# Patient Record
Sex: FEMALE | Race: WHITE | NOT HISPANIC OR LATINO | Employment: OTHER | URBAN - METROPOLITAN AREA
[De-identification: names, ages, dates, MRNs, and addresses within clinical notes are randomized per-mention and may not be internally consistent; named-entity substitution may affect disease eponyms.]

---

## 2022-05-10 ENCOUNTER — NURSE NAVIGATOR (OUTPATIENT)
Dept: ONCOLOGY | Facility: CLINIC | Age: 72
End: 2022-05-10

## 2022-05-10 ENCOUNTER — OFFICE VISIT (OUTPATIENT)
Dept: PULMONOLOGY | Facility: CLINIC | Age: 72
End: 2022-05-10

## 2022-05-10 VITALS
WEIGHT: 95 LBS | DIASTOLIC BLOOD PRESSURE: 82 MMHG | HEART RATE: 59 BPM | SYSTOLIC BLOOD PRESSURE: 132 MMHG | HEIGHT: 62 IN | OXYGEN SATURATION: 96 % | BODY MASS INDEX: 17.48 KG/M2

## 2022-05-10 DIAGNOSIS — J43.2 CENTRILOBULAR EMPHYSEMA: ICD-10-CM

## 2022-05-10 DIAGNOSIS — R91.1 LUNG NODULE: Primary | ICD-10-CM

## 2022-05-10 DIAGNOSIS — Z00.6 EXAMINATION FOR NORMAL COMPARISON OR CONTROL IN CLINICAL RESEARCH: Primary | ICD-10-CM

## 2022-05-10 DIAGNOSIS — J40 BRONCHITIS: ICD-10-CM

## 2022-05-10 PROCEDURE — 99205 OFFICE O/P NEW HI 60 MIN: CPT | Performed by: INTERNAL MEDICINE

## 2022-05-10 RX ORDER — MELATONIN
1000 DAILY
COMMUNITY

## 2022-05-10 RX ORDER — IBUPROFEN 800 MG/1
800 TABLET ORAL 2 TIMES DAILY PRN
COMMUNITY
Start: 2022-03-17

## 2022-05-10 RX ORDER — BACLOFEN 10 MG/1
10 TABLET ORAL
COMMUNITY
Start: 2022-02-15

## 2022-05-10 RX ORDER — LANOLIN ALCOHOL/MO/W.PET/CERES
1000 CREAM (GRAM) TOPICAL DAILY
COMMUNITY

## 2022-05-10 RX ORDER — GABAPENTIN 300 MG/1
300 CAPSULE ORAL
COMMUNITY
Start: 2022-04-18

## 2022-05-10 RX ORDER — AMOXICILLIN AND CLAVULANATE POTASSIUM 500; 125 MG/1; MG/1
1 TABLET, FILM COATED ORAL 2 TIMES DAILY
Qty: 24 TABLET | Refills: 0 | Status: SHIPPED | OUTPATIENT
Start: 2022-05-10 | End: 2022-05-22

## 2022-05-10 RX ORDER — GABAPENTIN 300 MG/1
CAPSULE ORAL
COMMUNITY
End: 2022-06-08 | Stop reason: SDUPTHER

## 2022-05-10 RX ORDER — OMEGA-3 FATTY ACIDS/FISH OIL 300-1000MG
CAPSULE ORAL
COMMUNITY
End: 2022-05-10 | Stop reason: SDUPTHER

## 2022-05-10 NOTE — PROGRESS NOTES
Met patient and  in lung nodule clinic with Dr. Sauer. She has smoking history but quit 2008. She was being followed for lung abnormality by PCP. She had recent CT leading to PET evaluation revealing 1.3cm KERRIE nodule with SUV of 4.6. She has had clinical signs of infection including productive cough, pleuritic chest pain and weight loss. She has been on antibiotic therapies intermittently. Most recently mid/late April x7days of levofloxacin. She denies hemoptysis. Scans reviewed. Per Dr. Sauer x2 wk course of ABX with [facility] CT and OV same day early June. She v/u and agreeable to plan. Introduced lung navigator role and provided contact information. She knows to call with questions or concerns.

## 2022-05-10 NOTE — PROGRESS NOTES
Pulmonary Medicine Evaluation    Chief Complaint     PET avid pulmonary nodule    History of Present Illness/History Review     Is Amanda is a very nice 72-year-old former smoker ex of 2008 with a past medical history significant for centrilobular emphysema as well as a history of multiple pulmonary nodules that have been under surveillance.  Unfortunately I do not have much information regarding previous CT scans however she presents today for follow-up of a pet scan which was performed on 3/30/2022.  She states that she has frequent exacerbations of her respiratory status including multiple episodes of bronchitis per year.  She also states that she has been having some pleuritic chest pain in both the right as well as left side intermittently.  She has not had any fevers or chills.  She does note that she has lost approximately 20 pounds of weight in the past year or so and states that her appetite is fairly poor.  For the most part she does not feel short of breath even with activity.  She does bring up mucus from time to time and it is currently yellow.  She states that in April she had had an increased cough as well as some left chest soreness with expectoration of yellow and green sputum.  There was no hemoptysis.  She states that she also had been somewhat sick through March.  She did complete a 7-day course of Levaquin at the end of April and states that while she got a little bit better she did not feel that had cleared everything up for her.    The PET scan was performed on 3/30/2022 and was ordered for further evaluation of some pulmonary nodularity.  The report compares this to an old CT of 10/18/2021 though I have not been able to review this film or the report myself.  The PET scan which I have been able to review does show an approximately 1.3 cm irregular nodule in the posterior left upper lobe near the fissure.  This is PET avid with an SUV of 4.6.  There is also some questionable minimal PET  avidity to the station 4R however this is not enlarged.  The SUV is at physiologic level of 1.7.  The report notes that the nodule is relatively unchanged compared to the CT scan in 2021.  In addition, there are some scattered areas of apical scarring as well as some infiltrates versus scar in both the lingula as well as the right middle lobe.        Review of Systems  A full review of systems has been completed and it is negative except as mentioned expressly in the HPI and the scanned intake sheet.    Allergies   Allergen Reactions   • Codeine Anaphylaxis       Current Outpatient Medications:   •  baclofen (LIORESAL) 10 MG tablet, Take 10 mg by mouth every night at bedtime., Disp: , Rfl:   •  cholecalciferol (VITAMIN D3) 25 MCG (1000 UT) tablet, Take 1,000 Units by mouth Daily., Disp: , Rfl:   •  gabapentin (NEURONTIN) 300 MG capsule, Take 300 mg by mouth every night at bedtime., Disp: , Rfl:   •  ibuprofen (ADVIL,MOTRIN) 800 MG tablet, Take 800 mg by mouth 2 (Two) Times a Day As Needed., Disp: , Rfl:   •  vitamin B-12 (CYANOCOBALAMIN) 1000 MCG tablet, Take 1,000 mcg by mouth Daily., Disp: , Rfl:   •  amoxicillin-clavulanate (Augmentin) 500-125 MG per tablet, Take 1 tablet by mouth 2 (Two) Times a Day for 12 days., Disp: 24 tablet, Rfl: 0  •  gabapentin (NEURONTIN) 300 MG capsule, gabapentin, Disp: , Rfl:     Past Medical History:   Diagnosis Date   • Allergic rhinitis    • Chronic bronchitis (HCC)    • Pneumonia      Family History   Problem Relation Age of Onset   • Cancer Mother    • Hypotension Mother    • Thyroid cancer Mother    • Cancer Father    • Cancer Paternal Aunt    • Cancer Paternal Uncle    • Cancer Child      Social History     Tobacco Use   • Smoking status: Former Smoker     Packs/day: 1.00     Years: 20.00     Pack years: 20.00     Quit date: 2010     Years since quittin.0   • Smokeless tobacco: Never Used   Vaping Use   • Vaping Use: Never used   Substance Use Topics   • Drug  "use: Defer       /82   Pulse 59   Ht 157.5 cm (62\")   Wt 43.1 kg (95 lb)   SpO2 96% Comment: resting room air  BMI 17.38 kg/m²   Physical Exam  Constitutional:       Comments: Thin well-developed woman in no acute distress   HENT:      Head: Normocephalic and atraumatic.      Nose: Nose normal. No congestion.      Mouth/Throat:      Mouth: Mucous membranes are moist.      Pharynx: No oropharyngeal exudate.   Eyes:      General: No scleral icterus.     Extraocular Movements: Extraocular movements intact.      Conjunctiva/sclera: Conjunctivae normal.      Pupils: Pupils are equal, round, and reactive to light.   Neck:      Vascular: No carotid bruit.   Cardiovascular:      Rate and Rhythm: Regular rhythm. Bradycardia present.      Pulses: Normal pulses.      Heart sounds: Normal heart sounds. No murmur heard.  Pulmonary:      Effort: Pulmonary effort is normal. No respiratory distress.      Breath sounds: Normal breath sounds. No stridor. No wheezing, rhonchi or rales.   Chest:      Chest wall: No tenderness.   Abdominal:      General: Abdomen is flat. Bowel sounds are normal. There is no distension.      Palpations: Abdomen is soft.   Musculoskeletal:         General: No swelling or tenderness. Normal range of motion.      Cervical back: Normal range of motion and neck supple. No rigidity or tenderness.   Lymphadenopathy:      Cervical: No cervical adenopathy.   Skin:     General: Skin is warm.      Capillary Refill: Capillary refill takes less than 2 seconds.   Neurological:      General: No focal deficit present.      Mental Status: She is alert.   Psychiatric:         Mood and Affect: Mood normal.         Results     Pet imaging reviewed as per HPI.  Problem List       ICD-10-CM ICD-9-CM   1. Lung nodule  R91.1 793.11   2. Centrilobular emphysema (HCC)  J43.2 492.8   3. Bronchitis  J40 490       Impression and Plan     Mrs. Patel presents with a PET avid nodule in the left upper lobe which at least " reportedly was present on an old CT scan of 10/18/2021 which I have not been able to review.  She does currently have at least acute bronchitis if not pneumonia.  She is at very high risk of this being malignant however there is a possibility this could represent focal infection as well.  At this point, I feel that it would be reasonable to go ahead and treat her for a longer course with antibiotics and then after this has been completed to go ahead and perform a noncontrasted CT scan of the chest.  This will allow us to look for resolution however more importantly if it does not resolve it will give us a better roadmap for possible navigational bronchoscopy which I feel would be the best way to sample this area if necessary.  At this time I would not recommend any bronchodilator therapy as she does not have much in the way of symptoms from shortness of breath though she does have underlying COPD with emphysematous changes.  I am going to go ahead and give her a 12-day course of Augmentin.  We will plan to repeat the CT scan of the chest without contrast at the beginning of June and then have her follow-up later in that day with me.    Overall I do feel that we will likely need to proceed with biopsy at some point unless this nodule worsening to resolve or decrease in size.    I have encouraged her to try to increase her caloric intake and she is going to try some fruit flavored Ensure as she has somewhat of an intolerance to milk-based or milk like drinks.    Thank you very much for asking me to help in the care of this very nice patient.    I spent a total of 60 minutes with Mrs. Patel and her  with greater than 70% that time spent consultation regarding her diagnoses, review the data, and formulation of the plan moving forward.    Sancho Sauer MD, [facility]    CC: Gómez Bang MD

## 2022-05-19 ENCOUNTER — PATIENT ROUNDING (BHMG ONLY) (OUTPATIENT)
Dept: PULMONOLOGY | Facility: CLINIC | Age: 72
End: 2022-05-19

## 2022-05-19 NOTE — PROGRESS NOTES
"May 19, 2022    Hello, may I speak with Malissa Patel?    My name is SMILEY      I am  with [facility]  [address] [place] [id number] [phone].    Before we get started may I verify your date of birth? 1950    I am calling to officially welcome you to our practice and ask about your recent visit. Is this a good time to talk? YES    Tell me about your visit with us. What things went well? \" Everything went real well,  I thought Dr Sauer was great. Just waiting to hear when my next appointment is\"        We're always looking for ways to make our patients' experiences even better. Do you have recommendations on ways we may improve?  NO    Overall were you satisfied with your first visit to our practice? YES       I appreciate you taking the time to speak with me today. Is there anything else I can do for you? NO      Thank you, and have a great day.      "

## 2022-06-08 ENCOUNTER — HOSPITAL ENCOUNTER (OUTPATIENT)
Dept: CT IMAGING | Facility: HOSPITAL | Age: 72
Discharge: HOME | End: 2022-06-08
Admitting: NURSE PRACTITIONER

## 2022-06-08 ENCOUNTER — OFFICE VISIT (OUTPATIENT)
Dept: PULMONOLOGY | Facility: CLINIC | Age: 72
End: 2022-06-08

## 2022-06-08 VITALS
BODY MASS INDEX: 17.3 KG/M2 | OXYGEN SATURATION: 97 % | WEIGHT: 94 LBS | SYSTOLIC BLOOD PRESSURE: 124 MMHG | HEIGHT: 62 IN | TEMPERATURE: 97.5 F | DIASTOLIC BLOOD PRESSURE: 80 MMHG | HEART RATE: 84 BPM

## 2022-06-08 DIAGNOSIS — E44.0 MODERATE MALNUTRITION: ICD-10-CM

## 2022-06-08 DIAGNOSIS — R91.1 LUNG NODULE: ICD-10-CM

## 2022-06-08 DIAGNOSIS — R91.1 LUNG NODULE: Primary | ICD-10-CM

## 2022-06-08 DIAGNOSIS — J40 BRONCHITIS: ICD-10-CM

## 2022-06-08 DIAGNOSIS — J43.2 CENTRILOBULAR EMPHYSEMA: ICD-10-CM

## 2022-06-08 PROCEDURE — 71250 CT THORAX DX C-: CPT

## 2022-06-08 PROCEDURE — 99213 OFFICE O/P EST LOW 20 MIN: CPT | Performed by: INTERNAL MEDICINE

## 2022-06-08 RX ORDER — AMOXICILLIN AND CLAVULANATE POTASSIUM 500; 125 MG/1; MG/1
1 TABLET, FILM COATED ORAL 2 TIMES DAILY
Qty: 28 TABLET | Refills: 0 | Status: SHIPPED | OUTPATIENT
Start: 2022-06-08 | End: 2022-06-22

## 2022-06-08 RX ORDER — MEGESTROL ACETATE 20 MG/1
20 TABLET ORAL 2 TIMES DAILY
Qty: 60 TABLET | Refills: 2 | Status: SHIPPED | OUTPATIENT
Start: 2022-06-08

## 2022-06-10 DIAGNOSIS — R91.1 LUNG NODULE: Primary | ICD-10-CM

## 2022-06-16 NOTE — PROGRESS NOTES
Pulmonary Medicine Follow-up    Chief Complaint     Follow-up of left upper lobe spiculated nodule    History of Present Illness/History Review     Mrs. Patel returns to the clinic today in the company of her family.  She states that she has actually been doing fairly well since I last saw her in May.  She states that the Augmentin helped her quite a bit although she states that she continues to have a bit more congestion and has been coughing up green and yellow secretions.  She has not had any problems with fevers or chills.  She states that she would like to have further antibiotics as this did help her quite a bit.  Her appetite remains poor although she has been trying to force food down as able.  She has denied any hemoptysis.  She remains short of breath with activity.    CT scan of the chest performed without contrast today shows almost complete resolution of the previously seen left upper lobe/fissural spiculated nodule.  The remainder the lungs appear stable with chronic changes seen.  No new nodularity is noted.      Review of Systems  A full review of systems has been completed and it is negative except as mentioned expressly in HPI.    Allergies   Allergen Reactions   • Codeine Anaphylaxis       Current Outpatient Medications:   •  baclofen (LIORESAL) 10 MG tablet, Take 10 mg by mouth every night at bedtime., Disp: , Rfl:   •  cholecalciferol (VITAMIN D3) 25 MCG (1000 UT) tablet, Take 1,000 Units by mouth Daily., Disp: , Rfl:   •  gabapentin (NEURONTIN) 300 MG capsule, Take 300 mg by mouth every night at bedtime., Disp: , Rfl:   •  ibuprofen (ADVIL,MOTRIN) 800 MG tablet, Take 800 mg by mouth 2 (Two) Times a Day As Needed., Disp: , Rfl:   •  vitamin B-12 (CYANOCOBALAMIN) 1000 MCG tablet, Take 1,000 mcg by mouth Daily., Disp: , Rfl:   •  amoxicillin-clavulanate (Augmentin) 500-125 MG per tablet, Take 1 tablet by mouth 2 (Two) Times a Day for 14 days., Disp: 28 tablet, Rfl: 0  •  megestrol (MEGACE) 20 MG  "tablet, Take 1 tablet by mouth 2 (Two) Times a Day., Disp: 60 tablet, Rfl: 2    Past Medical History:   Diagnosis Date   • Allergic rhinitis    • Chronic bronchitis (HCC)    • Pneumonia      Family History   Problem Relation Age of Onset   • Cancer Mother    • Hypotension Mother    • Thyroid cancer Mother    • Cancer Father    • Cancer Paternal Aunt    • Cancer Paternal Uncle    • Cancer Child      Social History     Tobacco Use   • Smoking status: Former Smoker     Packs/day: 1.00     Years: 20.00     Pack years: 20.00     Quit date: 2010     Years since quittin.1   • Smokeless tobacco: Never Used   Vaping Use   • Vaping Use: Never used   Substance Use Topics   • Drug use: Defer       /80   Pulse 84   Temp 97.5 °F (36.4 °C)   Ht 157.5 cm (62\")   Wt 42.6 kg (94 lb)   LMP  (LMP Unknown)   SpO2 97% Comment: resting, room air  Breastfeeding No   BMI 17.19 kg/m²   Physical Exam  Constitutional:       Comments: Cachectic appearing.  No acute distress.   HENT:      Head: Normocephalic and atraumatic.      Nose: Nose normal. No congestion.      Mouth/Throat:      Mouth: Mucous membranes are moist.      Pharynx: No oropharyngeal exudate.   Eyes:      General: No scleral icterus.     Extraocular Movements: Extraocular movements intact.      Conjunctiva/sclera: Conjunctivae normal.      Pupils: Pupils are equal, round, and reactive to light.   Neck:      Vascular: No carotid bruit.   Cardiovascular:      Rate and Rhythm: Regular rhythm. Bradycardia present.      Pulses: Normal pulses.      Heart sounds: Normal heart sounds. No murmur heard.  Pulmonary:      Effort: Pulmonary effort is normal. No respiratory distress.      Breath sounds: No wheezing or rales.      Comments: She does have some mildly coarse breath sounds in the bilateral bases.  Otherwise clear.  Mildly coarse upper airway sounds with no stridor.  Chest:      Chest wall: No tenderness.   Abdominal:      General: Abdomen is flat. Bowel " sounds are normal. There is no distension.      Palpations: Abdomen is soft.   Musculoskeletal:         General: No swelling or tenderness. Normal range of motion.      Cervical back: Normal range of motion and neck supple. No rigidity or tenderness.   Lymphadenopathy:      Cervical: No cervical adenopathy.   Skin:     General: Skin is warm.      Capillary Refill: Capillary refill takes less than 2 seconds.   Neurological:      General: No focal deficit present.      Mental Status: She is alert.   Psychiatric:         Mood and Affect: Mood normal.         Results     CT scan as per the HPI.    Problem List       ICD-10-CM ICD-9-CM   1. Lung nodule  R91.1 793.11   2. Centrilobular emphysema (HCC)  J43.2 492.8   3. Bronchitis, acute  J40 490   4. Moderate malnutrition (HCC)  E44.0 263.0       Impression and Plan     I am very happy to see that the left upper lobe nodule has almost completely resolved and based upon the appearance is most likely secondary to resolving inflammation or prior infection.  She is having some issues with bronchitis and as she previously did very well with Augmentin I would like to go ahead and give her another course of this for 14 days see if we can help clear up her breathing a bit better.    I continue to be concerned about her poor appetite and weight loss and I feel that she would benefit from an appetite stimulant.  To that end, I am going to prescribe Megace 20 mg by mouth twice daily.  I have also encouraged her to supplement her diet with nutrition shakes.    I still believe it is important for us to follow this CAT scan to make sure that we have full resolution given her risk factors.  I would like to see her back in the next 3 to 4 months and at that time we will perform a noncontrasted CT scan.  I did explain that we would spread out our surveillance depending upon the results of that test.    I have spent a total of 20 minutes with Mrs. Alvares and her family with greater than 70%  that time spent consultation regarding her diagnoses, review the data, and formulation of the plan moving forward.    Sancho Sauer MD, [facility]    CC: Gómez Bang MD

## 2022-09-08 ENCOUNTER — HOSPITAL ENCOUNTER (OUTPATIENT)
Dept: CT IMAGING | Facility: HOSPITAL | Age: 72
Discharge: HOME | End: 2022-09-08
Admitting: NURSE PRACTITIONER

## 2022-09-08 DIAGNOSIS — R91.1 LUNG NODULE: ICD-10-CM

## 2022-09-08 PROCEDURE — 71250 CT THORAX DX C-: CPT

## 2022-09-09 ENCOUNTER — OFFICE VISIT (OUTPATIENT)
Dept: PULMONOLOGY | Facility: CLINIC | Age: 72
End: 2022-09-09

## 2022-09-09 VITALS
BODY MASS INDEX: 16.63 KG/M2 | TEMPERATURE: 97.5 F | HEART RATE: 71 BPM | OXYGEN SATURATION: 97 % | DIASTOLIC BLOOD PRESSURE: 80 MMHG | SYSTOLIC BLOOD PRESSURE: 140 MMHG | HEIGHT: 62 IN | WEIGHT: 90.4 LBS

## 2022-09-09 DIAGNOSIS — J42 CHRONIC BRONCHITIS, UNSPECIFIED CHRONIC BRONCHITIS TYPE: ICD-10-CM

## 2022-09-09 DIAGNOSIS — R91.1 LUNG NODULE: ICD-10-CM

## 2022-09-09 DIAGNOSIS — J40 BRONCHITIS: Primary | ICD-10-CM

## 2022-09-09 DIAGNOSIS — R91.1 PULMONARY NODULE: ICD-10-CM

## 2022-09-09 PROCEDURE — 99214 OFFICE O/P EST MOD 30 MIN: CPT | Performed by: NURSE PRACTITIONER

## 2022-09-09 RX ORDER — AMOXICILLIN AND CLAVULANATE POTASSIUM 875; 125 MG/1; MG/1
1 TABLET, FILM COATED ORAL 2 TIMES DAILY
Qty: 20 TABLET | Refills: 0 | Status: SHIPPED | OUTPATIENT
Start: 2022-09-09 | End: 2023-03-06

## 2022-09-09 RX ORDER — BUSPIRONE HYDROCHLORIDE 10 MG/1
10 TABLET ORAL 3 TIMES DAILY
COMMUNITY
Start: 2022-06-21

## 2022-09-09 NOTE — PROGRESS NOTES
[facility] Pulmonary Follow up    CHIEF COMPLAINT    Dyspnea with exertion    HISTORY OF PRESENT ILLNESS    Malissa Patel is a 72 y.o.female here today for follow-up.  She was last seen in the office by Dr. Duque in June.  She was referred to our office for a pulmonary nodule originally in May.  We have been continuing to follow closely.  She had a repeat CT scan yesterday and is here today to discuss the results.    At her last appointment Dr. Duque started her on Megace.  She states that she is eating better but has not been able to gain weight.      She did contact the COVID-19 virus a month ago and has had difficulty with her taste and smell and appetite.    She continues to have shortness of breath with exertion.  She does recover fairly quickly at rest.  She is on no inhaler therapy at this time.    She quit smoking in 2010 and has a 20-pack-year history.    She denies chest pain or palpitations.  She denies any lower extremity edema or calf tenderness.  She denies reflux symptoms.    She states that she feels congested and has been coughing up some thick yellow sputum.  She requesting the antibiotics that were called in at her last appointment.    She is up-to-date on her current vaccinations.  Patient Active Problem List   Diagnosis   • Pulmonary nodule   • Chronic bronchitis (HCC)       Allergies   Allergen Reactions   • Codeine Anaphylaxis       Current Outpatient Medications:   •  baclofen (LIORESAL) 10 MG tablet, Take 10 mg by mouth every night at bedtime., Disp: , Rfl:   •  gabapentin (NEURONTIN) 300 MG capsule, Take 300 mg by mouth every night at bedtime., Disp: , Rfl:   •  ibuprofen (ADVIL,MOTRIN) 800 MG tablet, Take 800 mg by mouth 2 (Two) Times a Day As Needed., Disp: , Rfl:   •  Phenylephrine-APAP-guaiFENesin (TYLENOL SINUS SEVERE PO), Take  by mouth., Disp: , Rfl:   •  amoxicillin-clavulanate (Augmentin) 875-125 MG per tablet, Take 1 tablet by mouth 2 (Two) Times a Day., Disp: 20 tablet,  Rfl: 0  •  busPIRone (BUSPAR) 10 MG tablet, Take 10 mg by mouth 3 (Three) Times a Day., Disp: , Rfl:   •  cholecalciferol (VITAMIN D3) 25 MCG (1000 UT) tablet, Take 1,000 Units by mouth Daily., Disp: , Rfl:   •  megestrol (MEGACE) 20 MG tablet, Take 1 tablet by mouth 2 (Two) Times a Day., Disp: 60 tablet, Rfl: 2  •  vitamin B-12 (CYANOCOBALAMIN) 1000 MCG tablet, Take 1,000 mcg by mouth Daily., Disp: , Rfl:   MEDICATION LIST AND ALLERGIES REVIEWED.    Social History     Tobacco Use   • Smoking status: Former Smoker     Packs/day: 1.00     Years: 20.00     Pack years: 20.00     Quit date: 2010     Years since quittin.3   • Smokeless tobacco: Never Used   Vaping Use   • Vaping Use: Never used   Substance Use Topics   • Drug use: Defer       FAMILY AND SOCIAL HISTORY REVIEWED.    Review of Systems   Constitutional: Positive for fatigue. Negative for activity change, appetite change, fever and unexpected weight change.   HENT: Negative for congestion, postnasal drip, rhinorrhea, sinus pressure, sore throat and voice change.    Eyes: Negative for visual disturbance.   Respiratory: Positive for shortness of breath. Negative for cough, chest tightness and wheezing.    Cardiovascular: Negative for chest pain, palpitations and leg swelling.   Gastrointestinal: Negative for abdominal distention, abdominal pain, nausea and vomiting.   Endocrine: Negative for cold intolerance and heat intolerance.   Genitourinary: Negative for difficulty urinating and urgency.   Musculoskeletal: Negative for arthralgias, back pain and neck pain.   Skin: Negative for color change and pallor.   Allergic/Immunologic: Negative for environmental allergies and food allergies.   Neurological: Negative for dizziness, syncope, weakness and light-headedness.   Hematological: Negative for adenopathy. Does not bruise/bleed easily.   Psychiatric/Behavioral: Negative for agitation and behavioral problems.   .    /80 (BP Location: Right arm,  "Patient Position: Sitting, Cuff Size: Adult)   Pulse 71   Temp 97.5 °F (36.4 °C) (Infrared)   Ht 157.5 cm (62\")   Wt 41 kg (90 lb 6.4 oz)   LMP  (LMP Unknown)   SpO2 97% Comment: resting, room air  Breastfeeding No   BMI 16.53 kg/m²     Immunization History   Administered Date(s) Administered   • COVID-19 (MODERNA) 1st, 2nd, 3rd Dose Only 03/06/2021, 04/07/2021   • FLUAD TRI 65YR+ 10/29/2017   • Pneumococcal Conjugate 13-Valent (PCV13) 10/29/2017       Physical Exam  Vitals and nursing note reviewed.   Constitutional:       Appearance: She is well-developed. She is not diaphoretic.   HENT:      Head: Normocephalic and atraumatic.   Eyes:      Pupils: Pupils are equal, round, and reactive to light.   Neck:      Thyroid: No thyromegaly.   Cardiovascular:      Rate and Rhythm: Normal rate and regular rhythm.      Heart sounds: Normal heart sounds. No murmur heard.    No friction rub. No gallop.   Pulmonary:      Effort: Pulmonary effort is normal. No respiratory distress.      Breath sounds: Normal breath sounds. No wheezing or rales.   Chest:      Chest wall: No tenderness.   Abdominal:      General: Bowel sounds are normal.      Palpations: Abdomen is soft.      Tenderness: There is no abdominal tenderness.   Musculoskeletal:         General: Normal range of motion.      Cervical back: Normal range of motion and neck supple.   Lymphadenopathy:      Cervical: No cervical adenopathy.   Skin:     General: Skin is warm and dry.      Capillary Refill: Capillary refill takes less than 2 seconds.   Neurological:      Mental Status: She is alert and oriented to person, place, and time.   Psychiatric:         Behavior: Behavior normal.           RESULTS    CT Chest Without Contrast Diagnostic    Result Date: 9/8/2022  1. No acute intrathoracic process. 2. No new or enlarging pulmonary nodule. 3. Chronic scarring at the apices appears stable. 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 4. Additional chronic findings above.  This report was finalized on 9/8/2022 12:24 PM by Morgan Snell MD.      PROBLEM LIST    Problem List Items Addressed This Visit        Pulmonary and Pneumonias    Pulmonary nodule    Chronic bronchitis (HCC)    Relevant Medications    Phenylephrine-APAP-guaiFENesin (TYLENOL SINUS SEVERE PO)      Other Visit Diagnoses     Bronchitis    -  Primary    Relevant Medications    Phenylephrine-APAP-guaiFENesin (TYLENOL SINUS SEVERE PO)    amoxicillin-clavulanate (Augmentin) 875-125 MG per tablet    Lung nodule        Relevant Orders    CT Chest Without Contrast            DISCUSSION  Ms. Patel was here for follow-up.  We did review her CT scan that was performed yesterday and shows stable findings.  We will continue to do a follow-up CT scan in 6 months.  This will be due in March.    I will call in an antibiotic for her bronchitis.  I did advise her to keep this on hand in case she were to get sick in the near future.    She will follow-up in 6 months or sooner if her symptoms worsen.  I did advise her that we would perform PFTs at her next appointment.    Level of service justified based on 31 minutes spent in patient care on this date of service including, but not limited to: preparing to see the patient, obtaining and/or reviewing history, performing medically appropriate examination, ordering tests/medicine/procedures, independently interpreting results, documenting clinical information in EHR, and counseling/education of patient/family/caregiver. (Level 4 30-39 minutes; Level 5 40-54 minutes)      ANASTASIIA Sheikh  09/09/202215:29 EDT  Electronically signed     Please note that portions of this note were completed with a voice recognition program.        CC: Gómez Bang MD

## 2023-03-01 ENCOUNTER — APPOINTMENT (OUTPATIENT)
Dept: CT IMAGING | Facility: HOSPITAL | Age: 73
End: 2023-03-01

## 2023-03-06 ENCOUNTER — HOSPITAL ENCOUNTER (OUTPATIENT)
Dept: CT IMAGING | Facility: HOSPITAL | Age: 73
Discharge: HOME | End: 2023-03-06
Admitting: NURSE PRACTITIONER
Payer: MEDICARE

## 2023-03-06 ENCOUNTER — OFFICE VISIT (OUTPATIENT)
Dept: PULMONOLOGY | Facility: CLINIC | Age: 73
End: 2023-03-06
Payer: MEDICARE

## 2023-03-06 VITALS
SYSTOLIC BLOOD PRESSURE: 136 MMHG | WEIGHT: 95.25 LBS | OXYGEN SATURATION: 97 % | DIASTOLIC BLOOD PRESSURE: 70 MMHG | HEART RATE: 74 BPM | TEMPERATURE: 97.7 F | HEIGHT: 62 IN | RESPIRATION RATE: 18 BRPM | BODY MASS INDEX: 17.53 KG/M2

## 2023-03-06 DIAGNOSIS — R91.1 PULMONARY NODULE: ICD-10-CM

## 2023-03-06 DIAGNOSIS — J42 CHRONIC BRONCHITIS, UNSPECIFIED CHRONIC BRONCHITIS TYPE: Primary | ICD-10-CM

## 2023-03-06 DIAGNOSIS — R91.1 LUNG NODULE: ICD-10-CM

## 2023-03-06 DIAGNOSIS — Z87.891 PERSONAL HISTORY OF SMOKING: ICD-10-CM

## 2023-03-06 PROCEDURE — 99214 OFFICE O/P EST MOD 30 MIN: CPT | Performed by: NURSE PRACTITIONER

## 2023-03-06 PROCEDURE — 71250 CT THORAX DX C-: CPT

## 2023-03-06 RX ORDER — AMOXICILLIN AND CLAVULANATE POTASSIUM 875; 125 MG/1; MG/1
1 TABLET, FILM COATED ORAL 2 TIMES DAILY
Qty: 20 TABLET | Refills: 0 | Status: SHIPPED | OUTPATIENT
Start: 2023-03-06

## 2023-03-06 NOTE — PROGRESS NOTES
[facility] up    CHIEF COMPLAINT    Shortness of breath    HISTORY OF PRESENT ILLNESS    Malissa Patel is a 73 y.o.female here today for follow-up.  She was last seen in the office by me in September.  She states that she did use the antibiotic that I sent her to have on hand in case she were to get sick.  She used it a couple months ago.  She states that it did clear up her infection quickly.  She denies any respiratory illnesses since this time.    She is currently not on any inhaler therapy.  She does have some shortness of breath with exertion but does recover very quickly at rest.  She has not noticed a significant worsening shortness of breath since her last appointment.    She denies sputum production or hemoptysis.  She denies any fever, chills or night sweats.  She denies any chest pain or palpitations.  She denies any lower extremity edema or calf tenderness.    She quit smoking in 2010 and has a 20-pack-year history.    She is accompanied by her  today.  She had a repeat CT scan performed today and is here today to discuss the results.    Patient Active Problem List   Diagnosis   • Pulmonary nodule   • Chronic bronchitis (HCC)       Allergies   Allergen Reactions   • Codeine Anaphylaxis       Current Outpatient Medications:   •  baclofen (LIORESAL) 10 MG tablet, Take 1 tablet by mouth every night at bedtime., Disp: , Rfl:   •  busPIRone (BUSPAR) 10 MG tablet, Take 1 tablet by mouth 3 (Three) Times a Day., Disp: , Rfl:   •  cholecalciferol (VITAMIN D3) 25 MCG (1000 UT) tablet, Take 1 tablet by mouth Daily., Disp: , Rfl:   •  gabapentin (NEURONTIN) 300 MG capsule, Take 1 capsule by mouth every night at bedtime., Disp: , Rfl:   •  ibuprofen (ADVIL,MOTRIN) 800 MG tablet, Take 1 tablet by mouth 2 (Two) Times a Day As Needed., Disp: , Rfl:   •  megestrol (MEGACE) 20 MG tablet, Take 1 tablet by mouth 2 (Two) Times a Day., Disp: 60 tablet, Rfl: 2  •  Phenylephrine-APAP-guaiFENesin  "(TYLENOL SINUS SEVERE PO), Take  by mouth., Disp: , Rfl:   •  vitamin B-12 (CYANOCOBALAMIN) 1000 MCG tablet, Take 1 tablet by mouth Daily., Disp: , Rfl:   •  amoxicillin-clavulanate (Augmentin) 875-125 MG per tablet, Take 1 tablet by mouth 2 (Two) Times a Day., Disp: 20 tablet, Rfl: 0  MEDICATION LIST AND ALLERGIES REVIEWED.    Social History     Tobacco Use   • Smoking status: Former     Packs/day: 1.00     Years: 20.00     Pack years: 20.00     Types: Cigarettes     Quit date: 2010     Years since quittin.8   • Smokeless tobacco: Never   Vaping Use   • Vaping Use: Never used   Substance Use Topics   • Drug use: Defer       FAMILY AND SOCIAL HISTORY REVIEWED.    Review of Systems   Constitutional: Positive for fatigue. Negative for activity change, appetite change, fever and unexpected weight change.   HENT: Negative for congestion, postnasal drip, rhinorrhea, sinus pressure, sore throat and voice change.    Eyes: Negative for visual disturbance.   Respiratory: Positive for shortness of breath. Negative for cough, chest tightness and wheezing.    Cardiovascular: Negative for chest pain, palpitations and leg swelling.   Gastrointestinal: Negative for abdominal distention, abdominal pain, nausea and vomiting.   Endocrine: Negative for cold intolerance and heat intolerance.   Genitourinary: Negative for difficulty urinating and urgency.   Musculoskeletal: Negative for arthralgias, back pain and neck pain.   Skin: Negative for color change and pallor.   Allergic/Immunologic: Negative for environmental allergies and food allergies.   Neurological: Negative for dizziness, syncope, weakness and light-headedness.   Hematological: Negative for adenopathy. Does not bruise/bleed easily.   Psychiatric/Behavioral: Negative for agitation and behavioral problems.   .    /70 (BP Location: Left arm, Patient Position: Sitting, Cuff Size: Adult)   Pulse 74   Temp 97.7 °F (36.5 °C)   Resp 18   Ht 157.5 cm (62.01\")  "  Wt 43.2 kg (95 lb 4 oz)   LMP  (LMP Unknown)   SpO2 97% Comment: room air at rest  BMI 17.42 kg/m²     Immunization History   Administered Date(s) Administered   • COVID-19 (MODERNA) 1st, 2nd, 3rd Dose Only 03/06/2021, 04/07/2021   • FLUAD TRI 65YR+ 10/29/2017   • Pneumococcal Conjugate 13-Valent (PCV13) 10/29/2017       Physical Exam  Vitals and nursing note reviewed.   Constitutional:       Appearance: She is well-developed. She is not diaphoretic.   HENT:      Head: Normocephalic and atraumatic.   Eyes:      Pupils: Pupils are equal, round, and reactive to light.   Neck:      Thyroid: No thyromegaly.   Cardiovascular:      Rate and Rhythm: Normal rate and regular rhythm.      Heart sounds: Normal heart sounds. No murmur heard.    No friction rub. No gallop.   Pulmonary:      Effort: Pulmonary effort is normal. No respiratory distress.      Breath sounds: Normal breath sounds. No wheezing or rales.   Chest:      Chest wall: No tenderness.   Abdominal:      General: Bowel sounds are normal.      Palpations: Abdomen is soft.      Tenderness: There is no abdominal tenderness.   Musculoskeletal:         General: No swelling. Normal range of motion.      Cervical back: Normal range of motion and neck supple.   Lymphadenopathy:      Cervical: No cervical adenopathy.   Skin:     General: Skin is warm and dry.      Capillary Refill: Capillary refill takes less than 2 seconds.   Neurological:      Mental Status: She is alert and oriented to person, place, and time.   Psychiatric:         Mood and Affect: Mood normal.         Behavior: Behavior normal.           RESULTS    CT Chest Without Contrast Diagnostic    Result Date: 3/6/2023  Impression: 1.No new or enlarging pulmonary nodule. 2.Stable appearance of chronic bronchiectasis, scarring, tree-in-bud nodularity in the right middle lobe and lingula which may be due to sequela of chronic atypical mycobacterial infection. Electronically Signed: Alla Chu  3/6/2023  11:06 AM EST  Workstation ID: [id number]    PROBLEM LIST    Problem List Items Addressed This Visit        Pulmonary and Pneumonias    Pulmonary nodule    Chronic bronchitis (HCC) - Primary    Relevant Medications    amoxicillin-clavulanate (Augmentin) 875-125 MG per tablet   Other Visit Diagnoses     Personal history of smoking        Relevant Orders    CT chest low dose wo            DISCUSSION    Ms. Patel was here for follow-up after she had a CT scan performed today.  We did review the CT scan that showed no new or enlarging pulmonary nodules.  He also shows chronic stable bronchiectasis in the right middle lobe and lingula.  She is currently asymptomatic.  She has not had any antibiotics in the last few months.    I will call in in the Augmentin on hand in case she were to be sick in the.  She knows to use it only if necessary.    She is currently not on any inhalers at this time.  She denies any worsening breathing difficulties.    Based on her smoking history she does qualify for yearly CT screenings.  Her next CT scan will be due in 1 year.    She will follow-up in 1 year with full PFTs or sooner if her symptoms worsen.  I did advise her if she starts to have more frequent infection she needs to be seen sooner.    I personally spent a total of 35 minutes on patient visit today including chart review, face to face with the patient obtaining the history and physical exam, review of pertinent images and tests, counseling and discussion and/or coordination of care as described above, and documentation.  Total time excludes time spent on other separate services such as performing procedures or test interpretation, if applicable.        Sonali Linton, ANASTASIIA  03/06/202315:37 EST  Electronically signed     Please note that portions of this note were completed with a voice recognition program.        CC: Gómez Bang MD